# Patient Record
Sex: MALE | Race: WHITE | NOT HISPANIC OR LATINO
[De-identification: names, ages, dates, MRNs, and addresses within clinical notes are randomized per-mention and may not be internally consistent; named-entity substitution may affect disease eponyms.]

---

## 2019-01-27 ENCOUNTER — TRANSCRIPTION ENCOUNTER (OUTPATIENT)
Age: 69
End: 2019-01-27

## 2019-09-21 ENCOUNTER — TRANSCRIPTION ENCOUNTER (OUTPATIENT)
Age: 69
End: 2019-09-21

## 2020-01-14 ENCOUNTER — TRANSCRIPTION ENCOUNTER (OUTPATIENT)
Age: 70
End: 2020-01-14

## 2020-05-02 ENCOUNTER — TRANSCRIPTION ENCOUNTER (OUTPATIENT)
Age: 70
End: 2020-05-02

## 2022-03-13 ENCOUNTER — TRANSCRIPTION ENCOUNTER (OUTPATIENT)
Age: 72
End: 2022-03-13

## 2022-11-10 ENCOUNTER — NON-APPOINTMENT (OUTPATIENT)
Age: 72
End: 2022-11-10

## 2022-11-15 ENCOUNTER — NON-APPOINTMENT (OUTPATIENT)
Age: 72
End: 2022-11-15

## 2023-01-31 ENCOUNTER — NON-APPOINTMENT (OUTPATIENT)
Age: 73
End: 2023-01-31

## 2023-03-21 ENCOUNTER — NON-APPOINTMENT (OUTPATIENT)
Age: 73
End: 2023-03-21

## 2023-03-21 PROBLEM — Z00.00 ENCOUNTER FOR PREVENTIVE HEALTH EXAMINATION: Status: ACTIVE | Noted: 2023-03-21

## 2023-03-23 ENCOUNTER — APPOINTMENT (OUTPATIENT)
Dept: ORTHOPEDIC SURGERY | Facility: CLINIC | Age: 73
End: 2023-03-23
Payer: MEDICARE

## 2023-03-23 VITALS
DIASTOLIC BLOOD PRESSURE: 89 MMHG | WEIGHT: 169 LBS | HEIGHT: 69 IN | SYSTOLIC BLOOD PRESSURE: 150 MMHG | BODY MASS INDEX: 25.03 KG/M2

## 2023-03-23 DIAGNOSIS — M16.11 UNILATERAL PRIMARY OSTEOARTHRITIS, RIGHT HIP: ICD-10-CM

## 2023-03-23 DIAGNOSIS — M25.551 PAIN IN RIGHT HIP: ICD-10-CM

## 2023-03-23 PROCEDURE — 99205 OFFICE O/P NEW HI 60 MIN: CPT

## 2023-03-23 PROCEDURE — 73502 X-RAY EXAM HIP UNI 2-3 VIEWS: CPT

## 2023-03-23 NOTE — CONSULT LETTER
[Dear  ___] : Dear  [unfilled], [Consult Letter:] : I had the pleasure of evaluating your patient, [unfilled]. [Please see my note below.] : Please see my note below. [Consult Closing:] : Thank you very much for allowing me to participate in the care of this patient.  If you have any questions, please do not hesitate to contact me. [Sincerely,] : Sincerely, [FreeTextEntry2] : MAISHA GIBSON MD\par  [FreeTextEntry3] : Sj Miles MD\par Chief of Joint Replacement\par Primary [facility] Rev[facility] Hip and Knee Replacement \par [facility]\par \par

## 2023-03-23 NOTE — DISCUSSION/SUMMARY
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 Based upon the patients continued symptoms and failure to respond to conservative treatment I have recommended a right total hip replacement for the patient. A discussion was had with the patient regarding a right total hip replacement. Anterior and posterior exposures were discussed. For this patient an anterior approach is appropriate. A long discussion was had with the patient as what the total joint replacement would entail. A model was used to demonstrate the operation and to discuss bearing surfaces of the implants. The hospitalization and rehabilitation were discussed. The use of perioperative antibiotics and DVT prophylaxis were discussed. The risks, benefits and alternatives to surgical intervention were discussed at length with the patient. Specific risks discussed included: infection, wound breakdown, numbness and damage to nerves, tendon, muscle, arteries or other blood vessels, and the possibility of leg length discrepancy. The possibility of recurrent pain, no improvement in pain and actual worsening of the pain were also mentioned in conversation with the patient. Medical complications related to the patient's general medical health including deep vein thrombosis, pulmonary embolism, heart attack, stroke, death and other complications from anesthesia were discussed as well. The patient was told that we will take steps to minimize these risks by using sterile technique, antibiotics and DVT prophylaxis when appropriate and following the patient postoperatively in the clinic setting to monitor progress. The benefits of surgery were discussed with the patient including the potential to improve the current clinical condition through operative intervention. Alternatives to surgical intervention include continued conservative management which may yield less than optimal results in this particular patient. All questions were answered to the satisfaction of the patient. Models were used as an educational tool. We did discuss implant choices and fixation, with shared decision making with the patient.		 \par \par We are planning for robotic assistance for the total hip replacement. We discussed that this will require placement of iliac crest pins in the contralateral iliac crest for pelvic bone registration to allow for robotic guidance for the surgery. We will utilize robotic assistance to improve accuracy of the implants, and accurate restoration of both leg lengths and femoral offset.

## 2023-03-23 NOTE — ADDENDUM
[FreeTextEntry1] : This note was authored by Mehdi Medina working as a medical scribe for Dr. Sj Miles. The note was reviewed, edited, and revised by Dr. Sj Miles whom is in agreement with the exam findings, imaging findings, and treatment plan. 03/23/2023

## 2023-03-23 NOTE — PHYSICAL EXAM
[de-identified] : The patient appears well nourished  and in no apparent distress.  The patient is alert and oriented to person, place, and time.   Affect and mood appear normal. The head is normocephalic and atraumatic.  The eyes reveal normal sclera and extra ocular muscles are intact. The tongue is midline with no apparent lesions.  Skin shows normal turgor with no evidence of eczema or psoriasis.  No respiratory distress noted.  Sensation grossly intact.		  [de-identified] : Exam of the right hip shows hip flexion of 70 degrees (with pain), hip external rotation of 20 degrees, hip internal rotation of 0 degrees. His right leg is 1 cm shorter than his left leg. \par  5/5 motor strength bilaterally distally. Sensation intact distally.		  [de-identified] : X-ray: AP view of the pelvis and 2 views of the right hip demonstrate bone on bone arthritis.

## 2023-03-30 ENCOUNTER — OUTPATIENT (OUTPATIENT)
Dept: OUTPATIENT SERVICES | Facility: HOSPITAL | Age: 73
LOS: 1 days | End: 2023-03-30
Payer: MEDICARE

## 2023-03-30 ENCOUNTER — APPOINTMENT (OUTPATIENT)
Dept: CT IMAGING | Facility: CLINIC | Age: 73
End: 2023-03-30
Payer: MEDICARE

## 2023-03-30 DIAGNOSIS — M16.11 UNILATERAL PRIMARY OSTEOARTHRITIS, RIGHT HIP: ICD-10-CM

## 2023-03-30 PROCEDURE — 73700 CT LOWER EXTREMITY W/O DYE: CPT

## 2023-03-30 PROCEDURE — 73700 CT LOWER EXTREMITY W/O DYE: CPT | Mod: 26,RT,MH

## 2023-04-06 ENCOUNTER — OUTPATIENT (OUTPATIENT)
Dept: OUTPATIENT SERVICES | Facility: HOSPITAL | Age: 73
LOS: 1 days | End: 2023-04-06
Payer: MEDICARE

## 2023-04-06 VITALS
OXYGEN SATURATION: 98 % | WEIGHT: 172.84 LBS | DIASTOLIC BLOOD PRESSURE: 80 MMHG | HEART RATE: 87 BPM | RESPIRATION RATE: 16 BRPM | SYSTOLIC BLOOD PRESSURE: 122 MMHG | HEIGHT: 69 IN | TEMPERATURE: 97 F

## 2023-04-06 DIAGNOSIS — Z91.89 OTHER SPECIFIED PERSONAL RISK FACTORS, NOT ELSEWHERE CLASSIFIED: ICD-10-CM

## 2023-04-06 DIAGNOSIS — Z98.890 OTHER SPECIFIED POSTPROCEDURAL STATES: Chronic | ICD-10-CM

## 2023-04-06 DIAGNOSIS — I48.91 UNSPECIFIED ATRIAL FIBRILLATION: ICD-10-CM

## 2023-04-06 DIAGNOSIS — Z29.9 ENCOUNTER FOR PROPHYLACTIC MEASURES, UNSPECIFIED: ICD-10-CM

## 2023-04-06 DIAGNOSIS — Z01.818 ENCOUNTER FOR OTHER PREPROCEDURAL EXAMINATION: ICD-10-CM

## 2023-04-06 DIAGNOSIS — M16.11 UNILATERAL PRIMARY OSTEOARTHRITIS, RIGHT HIP: ICD-10-CM

## 2023-04-06 DIAGNOSIS — I10 ESSENTIAL (PRIMARY) HYPERTENSION: ICD-10-CM

## 2023-04-06 LAB
A1C WITH ESTIMATED AVERAGE GLUCOSE RESULT: 5.2 % — SIGNIFICANT CHANGE UP (ref 4–5.6)
ANION GAP SERPL CALC-SCNC: 11 MMOL/L — SIGNIFICANT CHANGE UP (ref 5–17)
APTT BLD: 36.3 SEC — HIGH (ref 27.5–35.5)
BASOPHILS # BLD AUTO: 0.04 K/UL — SIGNIFICANT CHANGE UP (ref 0–0.2)
BASOPHILS NFR BLD AUTO: 0.6 % — SIGNIFICANT CHANGE UP (ref 0–2)
BLD GP AB SCN SERPL QL: SIGNIFICANT CHANGE UP
BUN SERPL-MCNC: 17.5 MG/DL — SIGNIFICANT CHANGE UP (ref 8–20)
CALCIUM SERPL-MCNC: 9.3 MG/DL — SIGNIFICANT CHANGE UP (ref 8.4–10.5)
CHLORIDE SERPL-SCNC: 103 MMOL/L — SIGNIFICANT CHANGE UP (ref 96–108)
CO2 SERPL-SCNC: 29 MMOL/L — SIGNIFICANT CHANGE UP (ref 22–29)
CREAT SERPL-MCNC: 0.86 MG/DL — SIGNIFICANT CHANGE UP (ref 0.5–1.3)
EGFR: 90 ML/MIN/1.73M2 — SIGNIFICANT CHANGE UP
EOSINOPHIL # BLD AUTO: 0.16 K/UL — SIGNIFICANT CHANGE UP (ref 0–0.5)
EOSINOPHIL NFR BLD AUTO: 2.5 % — SIGNIFICANT CHANGE UP (ref 0–6)
ESTIMATED AVERAGE GLUCOSE: 103 MG/DL — SIGNIFICANT CHANGE UP (ref 68–114)
GLUCOSE SERPL-MCNC: 101 MG/DL — HIGH (ref 70–99)
HCT VFR BLD CALC: 41.6 % — SIGNIFICANT CHANGE UP (ref 39–50)
HGB BLD-MCNC: 13.9 G/DL — SIGNIFICANT CHANGE UP (ref 13–17)
IMM GRANULOCYTES NFR BLD AUTO: 0.2 % — SIGNIFICANT CHANGE UP (ref 0–0.9)
INR BLD: 1.32 RATIO — HIGH (ref 0.88–1.16)
LYMPHOCYTES # BLD AUTO: 1.17 K/UL — SIGNIFICANT CHANGE UP (ref 1–3.3)
LYMPHOCYTES # BLD AUTO: 18 % — SIGNIFICANT CHANGE UP (ref 13–44)
MCHC RBC-ENTMCNC: 33.1 PG — SIGNIFICANT CHANGE UP (ref 27–34)
MCHC RBC-ENTMCNC: 33.4 GM/DL — SIGNIFICANT CHANGE UP (ref 32–36)
MCV RBC AUTO: 99 FL — SIGNIFICANT CHANGE UP (ref 80–100)
MONOCYTES # BLD AUTO: 0.52 K/UL — SIGNIFICANT CHANGE UP (ref 0–0.9)
MONOCYTES NFR BLD AUTO: 8 % — SIGNIFICANT CHANGE UP (ref 2–14)
NEUTROPHILS # BLD AUTO: 4.6 K/UL — SIGNIFICANT CHANGE UP (ref 1.8–7.4)
NEUTROPHILS NFR BLD AUTO: 70.7 % — SIGNIFICANT CHANGE UP (ref 43–77)
PLATELET # BLD AUTO: 196 K/UL — SIGNIFICANT CHANGE UP (ref 150–400)
POTASSIUM SERPL-MCNC: 4.2 MMOL/L — SIGNIFICANT CHANGE UP (ref 3.5–5.3)
POTASSIUM SERPL-SCNC: 4.2 MMOL/L — SIGNIFICANT CHANGE UP (ref 3.5–5.3)
PROTHROM AB SERPL-ACNC: 15.4 SEC — HIGH (ref 10.5–13.4)
RBC # BLD: 4.2 M/UL — SIGNIFICANT CHANGE UP (ref 4.2–5.8)
RBC # FLD: 12.9 % — SIGNIFICANT CHANGE UP (ref 10.3–14.5)
SODIUM SERPL-SCNC: 143 MMOL/L — SIGNIFICANT CHANGE UP (ref 135–145)
WBC # BLD: 6.5 K/UL — SIGNIFICANT CHANGE UP (ref 3.8–10.5)
WBC # FLD AUTO: 6.5 K/UL — SIGNIFICANT CHANGE UP (ref 3.8–10.5)

## 2023-04-06 PROCEDURE — G0463: CPT

## 2023-04-06 PROCEDURE — 93005 ELECTROCARDIOGRAM TRACING: CPT

## 2023-04-06 PROCEDURE — 93010 ELECTROCARDIOGRAM REPORT: CPT

## 2023-04-06 NOTE — H&P PST ADULT - PROBLEM SELECTOR PLAN 1
Patient is scheduled for right hip replacement with JANNET robot with Dr Miles on 4/26/23.  Medical and cardiac evaluation pending

## 2023-04-06 NOTE — H&P PST ADULT - NSICDXPASTSURGICALHX_GEN_ALL_CORE_FT
PAST SURGICAL HISTORY:  H/O colonoscopy     H/O inguinal hernia repair     History of meniscectomy of left knee     S/P rotator cuff repair

## 2023-04-06 NOTE — H&P PST ADULT - NEGATIVE SKIN SYMPTOMS
no rash/no itching/no change in size/color of mole/no tumor/no brittle nails/no pitted nails/no hair loss

## 2023-04-06 NOTE — H&P PST ADULT - HISTORY OF PRESENT ILLNESS
73 year old male with pmhx of AFIB on Eliquis, HTN, OA.  Patient presents to PST today with complaint of right hip pain for the last few years,   Patient describes pain as aching, pain is intermittent, 7/10 in severity,   difficult to get in and out of the car, worse when he lays on his right side, standing from sitting  some relief with Tylenol has not tried injections   Imaging revealed       Patient is scheduled for right hip replacement with JANNET robot with Dr Miles on 4/26/23.  Medical and cardiac evaluation pending  73 year old male with pmhx of AFIB on Eliquis, HTN, OA.  Patient presents to [facility] today with complaint of right hip pain for the last few years. Patient describes pain as aching, pain is intermittent, 7/10 in severity, difficult to get in and out of the car, worse when he lays on his right side, standing from sitting, some relief with Tylenol has not tried injections. CT of right hip 4/3/23 revealed makoplasty severe right hip osteoarthritis, and mild/moderate degenerative osteoarthritis of the left hip and bilateral knees as described. Grade 1 anterolisthesis at L4-5, with moderate disc degeneration at L4-5 and L5-S1. Patient is scheduled for right hip replacement with JANNET robot with Dr Miles on 4/26/23.  Medical and cardiac evaluation pending

## 2023-04-06 NOTE — H&P PST ADULT - CARDIOVASCULAR
negative normal/S1 S2 present/no gallops/no rub/no murmur/no JVD/normal PMI/no pedal edema/Irregularly irregular rhythm

## 2023-04-06 NOTE — H&P PST ADULT - ASSESSMENT
CAPRINI SCORE    AGE RELATED RISK FACTORS                                                             [ ] Age 41-60 years                                            (1 Point)  [ ] Age: 61-74 years                                           (2 Points)                 [ ] Age= 75 years                                                (3 Points)             DISEASE RELATED RISK FACTORS                                                       [ ] Edema in the lower extremities                 (1 Point)                     [ ] Varicose veins                                               (1 Point)                                 [ ] BMI > 25 Kg/m2                                            (1 Point)                                  [ ] Serious infection (ie PNA)                            (1 Point)                     [ ] Lung disease ( COPD, Emphysema)            (1 Point)                                                                          [ ] Acute myocardial infarction                         (1 Point)                  [ ] Congestive heart failure (in the previous month)  (1 Point)         [ ] Inflammatory bowel disease                            (1 Point)                  [ ] Central venous access, PICC or Port               (2 points)       (within the last month)                                                                [ ] Stroke (in the previous month)                        (5 Points)    [ ] Previous or present malignancy                       (2 points)                                                                                                                                                         HEMATOLOGY RELATED FACTORS                                                         [ ] Prior episodes of VTE                                     (3 Points)                     [ ] Positive family history for VTE                      (3 Points)                  [ ] Prothrombin 40953 A                                     (3 Points)                     [ ] Factor V Leiden                                                (3 Points)                        [ ] Lupus anticoagulants                                      (3 Points)                                                           [ ] Anticardiolipin antibodies                              (3 Points)                                                       [ ] High homocysteine in the blood                   (3 Points)                                             [ ] Other congenital or acquired thrombophilia      (3 Points)                                                [ ] Heparin induced thrombocytopenia                  (3 Points)                                        MOBILITY RELATED FACTORS  [ ] Bed rest                                                         (1 Point)  [ ] Plaster cast                                                    (2 points)  [ ] Bed bound for more than 72 hours           (2 Points)    GENDER SPECIFIC FACTORS  [ ] Pregnancy or had a baby within the last month   (1 Point)  [ ] Post-partum < 6 weeks                                   (1 Point)  [ ] Hormonal therapy  or oral contraception   (1 Point)  [ ] History of pregnancy complications              (1 point)  [ ] Unexplained or recurrent              (1 Point)    OTHER RISK FACTORS                                           (1 Point)  [ ] BMI >40, smoking, diabetes requiring insulin, chemotherapy  blood transfusions and length of surgery over 2 hours    SURGERY RELATED RISK FACTORS  [ ]  Section within the last month     (1 Point)  [ ] Minor surgery                                                  (1 Point)  [ ] Arthroscopic surgery                                       (2 Points)  [ ] Planned major surgery lasting more            (2 Points)      than 45 minutes     [ ] Elective hip or knee joint replacement       (5 points)       surgery                                                TRAUMA RELATED RISK FACTORS  [ ] Fracture of the hip, pelvis, or leg                       (5 Points)  [ ] Spinal cord injury resulting in paralysis             (5 points)       (in the previous month)    [ ] Paralysis  (less than 1 month)                             (5 Points)  [ ] Multiple Trauma within 1 month                        (5 Points)    Total Score [        ]    Caprini Score 0-2: Low Risk, NO VTE prophylaxis required for most patients, encourage ambulation  Caprini Score 3-6: Moderate Risk , pharmacologic VTE prophylaxis is indicated for most patients (in the absence of contraindications)  Caprini Score Greater than or =7: High risk, pharmocologic VTE prophylaxis indicated for most patients (in the absence of contraindications)                OPIOID RISK TOOL    STEFF EACH BOX THAT APPLIES AND ADD TOTALS AT THE END    FAMILY HISTORY OF SUBSTANCE ABUSE                 FEMALE         MALE                                                Alcohol                             [  ]1 pt          [  ]3pts                                               Illegal Durgs                     [  ]2 pts        [  ]3pts                                               Rx Drugs                           [  ]4 pts        [  ]4 pts    PERSONAL HISTORY OF SUBSTANCE ABUSE                                                                                          Alcohol                             [  ]3 pts       [  ]3 pts                                               Illegal Durgs                     [  ]4 pts        [  ]4 pts                                               Rx Drugs                           [  ]5 pts        [  ]5 pts    AGE BETWEEN 16-45 YEARS                                      [  ]1 pt         [  ]1 pt    HISTORY OF PREADOLESCENT   SEXUAL ABUSE                                                             [  ]3 pts        [  ]0pts    PSYCHOLOGICAL DISEASE                     ADD, OCD, Bipolar, Schizophrenia        [  ]2 pts         [  ]2 pts                      Depression                                               [  ]1 pt           [  ]1 pt           SCORING TOTAL   (add numbers and type here)              (***)                                     A score of 3 or lower indicated LOW risk for future opiod abuse  A score of 4 to 7 indicated moderate risk for future opiod abuse  A score of 8 or higher indicates a high risk for opiod abuse    Patient educated on surgical scrub, COVID testing, preadmission instructions, medical clearance and day of procedure medications, verbalizes understanding.   Pt instructed to stop vitamins/supplements/herbal medications/ASA/NSAIDS for one week prior to surgery and discuss with PMD.                CAPRINI SCORE    AGE RELATED RISK FACTORS                                                             [ ] Age 41-60 years                                            (1 Point)  [ ] Age: 61-74 years                                           (2 Points)                 [ x] Age= 75 years                                                (3 Points)             DISEASE RELATED RISK FACTORS                                                       [ ] Edema in the lower extremities                 (1 Point)                     [ ] Varicose veins                                               (1 Point)                                 [x ] BMI > 25 Kg/m2                                            (1 Point)                                  [ ] Serious infection (ie PNA)                            (1 Point)                     [ ] Lung disease ( COPD, Emphysema)            (1 Point)                                                                          [ ] Acute myocardial infarction                         (1 Point)                  [ ] Congestive heart failure (in the previous month)  (1 Point)         [ ] Inflammatory bowel disease                            (1 Point)                  [ ] Central venous access, PICC or Port               (2 points)       (within the last month)                                                                [ ] Stroke (in the previous month)                        (5 Points)    [ ] Previous or present malignancy                       (2 points)                                                                                                                                                         HEMATOLOGY RELATED FACTORS                                                         [ ] Prior episodes of VTE                                     (3 Points)                     [ ] Positive family history for VTE                      (3 Points)                  [ ] Prothrombin 16606 A                                     (3 Points)                     [ ] Factor V Leiden                                                (3 Points)                        [ ] Lupus anticoagulants                                      (3 Points)                                                           [ ] Anticardiolipin antibodies                              (3 Points)                                                       [ ] High homocysteine in the blood                   (3 Points)                                             [ ] Other congenital or acquired thrombophilia      (3 Points)                                                [ ] Heparin induced thrombocytopenia                  (3 Points)                                        MOBILITY RELATED FACTORS  [ ] Bed rest                                                         (1 Point)  [ ] Plaster cast                                                    (2 points)  [ ] Bed bound for more than 72 hours           (2 Points)    GENDER SPECIFIC FACTORS  [ ] Pregnancy or had a baby within the last month   (1 Point)  [ ] Post-partum < 6 weeks                                   (1 Point)  [ ] Hormonal therapy  or oral contraception   (1 Point)  [ ] History of pregnancy complications              (1 point)  [ ] Unexplained or recurrent              (1 Point)    OTHER RISK FACTORS                                           (1 Point)  [ ] BMI >40, smoking, diabetes requiring insulin, chemotherapy  blood transfusions and length of surgery over 2 hours    SURGERY RELATED RISK FACTORS  [ ]  Section within the last month     (1 Point)  [ ] Minor surgery                                                  (1 Point)  [ ] Arthroscopic surgery                                       (2 Points)  [ ] Planned major surgery lasting more            (2 Points)      than 45 minutes     [x ] Elective hip or knee joint replacement       (5 points)       surgery                                                TRAUMA RELATED RISK FACTORS  [ ] Fracture of the hip, pelvis, or leg                       (5 Points)  [ ] Spinal cord injury resulting in paralysis             (5 points)       (in the previous month)    [ ] Paralysis  (less than 1 month)                             (5 Points)  [ ] Multiple Trauma within 1 month                        (5 Points)    Total Score [        ]    Caprini Score 0-2: Low Risk, NO VTE prophylaxis required for most patients, encourage ambulation  Caprini Score 3-6: Moderate Risk , pharmacologic VTE prophylaxis is indicated for most patients (in the absence of contraindications)  Caprini Score Greater than or =7: High risk, pharmocologic VTE prophylaxis indicated for most patients (in the absence of contraindications)                OPIOID RISK TOOL    STEFF EACH BOX THAT APPLIES AND ADD TOTALS AT THE END    FAMILY HISTORY OF SUBSTANCE ABUSE                 FEMALE         MALE                                                Alcohol                             [  ]1 pt          [  ]3pts                                               Illegal Durgs                     [  ]2 pts        [  ]3pts                                               Rx Drugs                           [  ]4 pts        [  ]4 pts    PERSONAL HISTORY OF SUBSTANCE ABUSE                                                                                          Alcohol                             [  ]3 pts       [  ]3 pts                                               Illegal Durgs                     [  ]4 pts        [  ]4 pts                                               Rx Drugs                           [  ]5 pts        [  ]5 pts    AGE BETWEEN 16-45 YEARS                                      [  ]1 pt         [  ]1 pt    HISTORY OF PREADOLESCENT   SEXUAL ABUSE                                                             [  ]3 pts        [  ]0pts    PSYCHOLOGICAL DISEASE                     ADD, OCD, Bipolar, Schizophrenia        [  ]2 pts         [  ]2 pts                      Depression                                               [  ]1 pt           [  ]1 pt           SCORING TOTAL   (add numbers and type here)              (***)                                     A score of 3 or lower indicated LOW risk for future opiod abuse  A score of 4 to 7 indicated moderate risk for future opiod abuse  A score of 8 or higher indicates a high risk for opiod abuse    Patient educated on surgical scrub, COVID testing, preadmission instructions, medical clearance and day of procedure medications, verbalizes understanding.   Pt instructed to stop vitamins/supplements/herbal medications/ASA/NSAIDS for one week prior to surgery and discuss with PMD.                CAPRINI SCORE    AGE RELATED RISK FACTORS                                                             [ ] Age 41-60 years                                            (1 Point)  [ ] Age: 61-74 years                                           (2 Points)                 [ x] Age= 75 years                                                (3 Points)             DISEASE RELATED RISK FACTORS                                                       [ ] Edema in the lower extremities                 (1 Point)                     [ ] Varicose veins                                               (1 Point)                                 [x ] BMI > 25 Kg/m2                                            (1 Point)                                  [ ] Serious infection (ie PNA)                            (1 Point)                     [ ] Lung disease ( COPD, Emphysema)            (1 Point)                                                                          [ ] Acute myocardial infarction                         (1 Point)                  [ ] Congestive heart failure (in the previous month)  (1 Point)         [ ] Inflammatory bowel disease                            (1 Point)                  [ ] Central venous access, PICC or Port               (2 points)       (within the last month)                                                                [ ] Stroke (in the previous month)                        (5 Points)    [ ] Previous or present malignancy                       (2 points)                                                                                                                                                         HEMATOLOGY RELATED FACTORS                                                         [ ] Prior episodes of VTE                                     (3 Points)                     [ ] Positive family history for VTE                      (3 Points)                  [ ] Prothrombin 85807 A                                     (3 Points)                     [ ] Factor V Leiden                                                (3 Points)                        [ ] Lupus anticoagulants                                      (3 Points)                                                           [ ] Anticardiolipin antibodies                              (3 Points)                                                       [ ] High homocysteine in the blood                   (3 Points)                                             [ ] Other congenital or acquired thrombophilia      (3 Points)                                                [ ] Heparin induced thrombocytopenia                  (3 Points)                                        MOBILITY RELATED FACTORS  [ ] Bed rest                                                         (1 Point)  [ ] Plaster cast                                                    (2 points)  [ ] Bed bound for more than 72 hours           (2 Points)    GENDER SPECIFIC FACTORS  [ ] Pregnancy or had a baby within the last month   (1 Point)  [ ] Post-partum < 6 weeks                                   (1 Point)  [ ] Hormonal therapy  or oral contraception   (1 Point)  [ ] History of pregnancy complications              (1 point)  [ ] Unexplained or recurrent              (1 Point)    OTHER RISK FACTORS                                           (1 Point)  [ ] BMI >40, smoking, diabetes requiring insulin, chemotherapy  blood transfusions and length of surgery over 2 hours    SURGERY RELATED RISK FACTORS  [ ]  Section within the last month     (1 Point)  [ ] Minor surgery                                                  (1 Point)  [ ] Arthroscopic surgery                                       (2 Points)  [ ] Planned major surgery lasting more            (2 Points)      than 45 minutes     [x ] Elective hip or knee joint replacement       (5 points)       surgery                                                TRAUMA RELATED RISK FACTORS  [ ] Fracture of the hip, pelvis, or leg                       (5 Points)  [ ] Spinal cord injury resulting in paralysis             (5 points)       (in the previous month)    [ ] Paralysis  (less than 1 month)                             (5 Points)  [ ] Multiple Trauma within 1 month                        (5 Points)    Total Score [   9     ]    Caprini Score 0-2: Low Risk, NO VTE prophylaxis required for most patients, encourage ambulation  Caprini Score 3-6: Moderate Risk , pharmacologic VTE prophylaxis is indicated for most patients (in the absence of contraindications)  Caprini Score Greater than or =7: High risk, pharmocologic VTE prophylaxis indicated for most patients (in the absence of contraindications)        OPIOID RISK TOOL    STEFF EACH BOX THAT APPLIES AND ADD TOTALS AT THE END    FAMILY HISTORY OF SUBSTANCE ABUSE                 FEMALE         MALE                                                Alcohol                             [  ]1 pt          [  ]3pts                                               Illegal Durgs                     [  ]2 pts        [  ]3pts                                               Rx Drugs                           [  ]4 pts        [  ]4 pts    PERSONAL HISTORY OF SUBSTANCE ABUSE                                                                                          Alcohol                             [  ]3 pts       [  ]3 pts                                               Illegal Durgs                     [  ]4 pts        [  ]4 pts                                               Rx Drugs                           [  ]5 pts        [  ]5 pts    AGE BETWEEN 16-45 YEARS                                      [  ]1 pt         [  ]1 pt    HISTORY OF PREADOLESCENT   SEXUAL ABUSE                                                             [  ]3 pts        [  ]0pts    PSYCHOLOGICAL DISEASE                     ADD, OCD, Bipolar, Schizophrenia        [  ]2 pts         [  ]2 pts                      Depression                                               [  ]1 pt           [  ]1 pt           SCORING TOTAL   0                                   A score of 3 or lower indicated LOW risk for future opiod abuse  A score of 4 to 7 indicated moderate risk for future opiod abuse  A score of 8 or higher indicates a high risk for opiod abuse    73 year old male with pmhx of AFIB on Eliquis, HTN, OA.  Patient presents to PST today with complaint of right hip pain for the last few years. Patient describes pain as aching, pain is intermittent, 7/10 in severity, difficult to get in and out of the car, worse when he lays on his right side, standing from sitting, some relief with Tylenol has not tried injections. CT of right hip 4/3/23 revealed makoplasty severe right hip osteoarthritis, and mild/moderate degenerative osteoarthritis of the left hip and bilateral knees as described. Grade 1 anterolisthesis at L4-5, with moderate disc degeneration at L4-5 and L5-S1. Patient is scheduled for right hip replacement with JANNET robot with Dr Miles on 23. Patient educated on surgical scrub, COVID testing, preadmission instructions, medical, cardiac clearance and day of procedure medications, verbalizes understanding. Pt instructed to stop vitamins/supplements/herbal medications/NSAIDS for one week prior to surgery and discuss with PMD.

## 2023-04-06 NOTE — H&P PST ADULT - PROBLEM SELECTOR PLAN 2
Irregular rhythm today at PST, continue eliquis as prescribed, discuss when to stop with cardiologist, patient verbalized understanding, Cardiac evaluation pending

## 2023-04-06 NOTE — H&P PST ADULT - NSICDXPASTMEDICALHX_GEN_ALL_CORE_FT
PAST MEDICAL HISTORY:  Unilateral osteoarthritis of hip, right      PAST MEDICAL HISTORY:  Afib     Hypertension     Unilateral osteoarthritis of hip, right

## 2023-04-07 LAB
MRSA PCR RESULT.: SIGNIFICANT CHANGE UP
S AUREUS DNA NOSE QL NAA+PROBE: SIGNIFICANT CHANGE UP

## 2023-04-25 ENCOUNTER — TRANSCRIPTION ENCOUNTER (OUTPATIENT)
Age: 73
End: 2023-04-25

## 2023-04-26 ENCOUNTER — OUTPATIENT (OUTPATIENT)
Dept: OUTPATIENT SERVICES | Facility: HOSPITAL | Age: 73
LOS: 1 days | End: 2023-04-26
Payer: MEDICARE

## 2023-04-26 ENCOUNTER — APPOINTMENT (OUTPATIENT)
Dept: ORTHOPEDIC SURGERY | Facility: HOSPITAL | Age: 73
End: 2023-04-26

## 2023-04-26 ENCOUNTER — TRANSCRIPTION ENCOUNTER (OUTPATIENT)
Age: 73
End: 2023-04-26

## 2023-04-26 DIAGNOSIS — Z98.890 OTHER SPECIFIED POSTPROCEDURAL STATES: Chronic | ICD-10-CM

## 2023-04-26 PROCEDURE — 27130 TOTAL HIP ARTHROPLASTY: CPT | Mod: AS,RT

## 2023-04-26 DEVICE — STEM FEM ACTIS HIGH COLLAR SZ 7: Type: IMPLANTABLE DEVICE | Site: RIGHT | Status: FUNCTIONAL

## 2023-04-26 DEVICE — FEM HD CERAMIC ART BIOLOX DELTA: Type: IMPLANTABLE DEVICE | Site: RIGHT | Status: FUNCTIONAL

## 2023-04-26 DEVICE — BONE WAX 2.5GM: Type: IMPLANTABLE DEVICE | Site: RIGHT | Status: FUNCTIONAL

## 2023-04-26 DEVICE — MAKO KNEE TIBIAL CHECKPOINT: Type: IMPLANTABLE DEVICE | Site: RIGHT | Status: FUNCTIONAL

## 2023-04-26 DEVICE — MAKO CHECKPOINT 3.5MM HEX IMPACTION: Type: IMPLANTABLE DEVICE | Site: RIGHT | Status: FUNCTIONAL

## 2023-04-26 DEVICE — MAKO BONE PIN 4MM X 170MM: Type: IMPLANTABLE DEVICE | Site: RIGHT | Status: FUNCTIONAL

## 2023-04-26 DEVICE — SHELL ACET TRIDENT II E 52MM: Type: IMPLANTABLE DEVICE | Site: RIGHT | Status: FUNCTIONAL

## 2023-04-26 DEVICE — LINER ACET TRIDENT X3 0 DEG 36MM E: Type: IMPLANTABLE DEVICE | Site: RIGHT | Status: FUNCTIONAL

## 2023-04-26 DEVICE — FLOSEAL FAST PREP 10ML: Type: IMPLANTABLE DEVICE | Site: RIGHT | Status: FUNCTIONAL

## 2023-04-26 RX ORDER — VALSARTAN 80 MG/1
0 TABLET ORAL
Refills: 0 | DISCHARGE

## 2023-04-26 RX ORDER — GLUCOSAMINE HCL/CHONDROITIN SU 500-400 MG
0 CAPSULE ORAL
Refills: 0 | DISCHARGE

## 2023-04-27 ENCOUNTER — TRANSCRIPTION ENCOUNTER (OUTPATIENT)
Age: 73
End: 2023-04-27

## 2023-04-27 PROCEDURE — 80048 BASIC METABOLIC PNL TOTAL CA: CPT

## 2023-04-27 PROCEDURE — C1713: CPT

## 2023-04-27 PROCEDURE — C1889: CPT

## 2023-04-27 PROCEDURE — 27130 TOTAL HIP ARTHROPLASTY: CPT | Mod: RT

## 2023-04-27 PROCEDURE — 73502 X-RAY EXAM HIP UNI 2-3 VIEWS: CPT

## 2023-04-27 PROCEDURE — C1776: CPT

## 2023-04-27 PROCEDURE — 97163 PT EVAL HIGH COMPLEX 45 MIN: CPT

## 2023-04-27 PROCEDURE — S2900: CPT

## 2023-04-27 PROCEDURE — 0055T BONE SRGRY CMPTR CT/MRI IMAG: CPT | Mod: RT

## 2023-04-27 PROCEDURE — 85027 COMPLETE CBC AUTOMATED: CPT

## 2023-04-27 PROCEDURE — 36415 COLL VENOUS BLD VENIPUNCTURE: CPT

## 2023-04-27 RX ORDER — APIXABAN 2.5 MG/1
1 TABLET, FILM COATED ORAL
Qty: 0 | Refills: 0 | DISCHARGE

## 2023-04-28 ENCOUNTER — TRANSCRIPTION ENCOUNTER (OUTPATIENT)
Age: 73
End: 2023-04-28

## 2023-04-30 ENCOUNTER — TRANSCRIPTION ENCOUNTER (OUTPATIENT)
Age: 73
End: 2023-04-30

## 2023-04-30 ENCOUNTER — APPOINTMENT (OUTPATIENT)
Dept: AFTER HOURS CARE | Facility: EMERGENCY ROOM | Age: 73
End: 2023-04-30
Payer: MEDICARE

## 2023-04-30 PROCEDURE — 99203 OFFICE O/P NEW LOW 30 MIN: CPT | Mod: 95

## 2023-04-30 NOTE — PHYSICAL EXAM
[No Acute Distress] : no acute distress [Well-Appearing] : well-appearing [Normal Sclera/Conjunctiva] : normal sclera/conjunctiva [Normal Voice/Communication] : normal voice/communication [EOMI] : extraocular movements intact [Normal Outer Ear/Nose] : the outer ears and nose were normal in appearance [No Respiratory Distress] : no respiratory distress  [No Accessory Muscle Use] : no accessory muscle use [No Skin Lesions] : no skin lesions [No Focal Deficits] : no focal deficits [Speech Grossly Normal] : speech grossly normal [Alert and Oriented x3] : oriented to person, place, and time [de-identified] : +macular rash to lower face, forehead, neck

## 2023-04-30 NOTE — REVIEW OF SYSTEMS
[Fever] : no fever [Chills] : no chills [Vision Problems] : no vision problems [Itching] : no itching [Chest Pain] : no chest pain [Palpitations] : no palpitations [Shortness Of Breath] : no shortness of breath [Wheezing] : no wheezing [Cough] : no cough [Nausea] : no nausea [Vomiting] : no vomiting [Joint Pain] : no joint pain [Skin Rash] : skin rash [Dizziness] : no dizziness [Headache] : no headache

## 2023-04-30 NOTE — ASSESSMENT
[FreeTextEntry1] : 73y M w/ PMHx Afib on Eliquis, recent hip replacement surgery 4 days ago presenting with rash to lower part of face that started yesterday. Macular rash to lower aspect of face and part of for head extending underneath chin and to superior aspect of his neck. No involvement of palms or soles of feet. Given only new medication Celebrex, patient advised to hold the medication concern for possible drug related rash, contact at orthopedic surgeon, apply emollient to affected area, avoid shaving our sun exposure. Given location of rash on face do not advise prescription for steroid cream, and given patient's medical history as he is on Eliquis will avoid oral steroids. Patient is agreeable with plan, states he will call his orthopedic surgeon and let him know, has been advised of signs and symptoms of a severe allergic reaction and understands when to go to the emergency department.

## 2023-04-30 NOTE — PLAN
[FreeTextEntry1] : -apply emollient to affected areas\par -avoid sun exposure and shaving\par -stop celebrex\par -f/u w/ orthopedic surgeon\par -anticipatory guidance discussed with patient including signs and symptoms of a severe allergic reaction

## 2023-04-30 NOTE — HISTORY OF PRESENT ILLNESS
[Home] : at home, [unfilled] , at the time of the visit. [Other Location: e.g. Home (Enter Location, City,State)___] : at [unfilled] [Verbal consent obtained from patient] : the patient, [unfilled] [FreeTextEntry8] : 73y M w/ PMHx Afib on Eliquis, recent hip replacement surgery 4 days ago presenting with rash to lower part of face that started yesterday. Patient states he was recently started on celebrex, tramadol for pain control but stopped the tramadol after one dose on Wednesday as he did not like the way it make him feel. Has continued with the Celebrex which he said he has never taken in the past. Patient noted this morning when attempting to shave his skin became more irritated. Denies fever, chills, tongue or throat swelling, difficulty breathing, nausea or vomiting. Patient denies any prior history of drug allergies. Also denies recent changes to detergents, face washes, shampoos or conditioners, shaving gel. Denies rash to palms or soles of feet. Only located to face. Denies associated swelling, dizziness, visual changes.

## 2023-05-01 ENCOUNTER — TRANSCRIPTION ENCOUNTER (OUTPATIENT)
Age: 73
End: 2023-05-01

## 2023-05-01 ENCOUNTER — NON-APPOINTMENT (OUTPATIENT)
Age: 73
End: 2023-05-01

## 2023-05-01 DIAGNOSIS — M16.11 UNILATERAL PRIMARY OSTEOARTHRITIS, RIGHT HIP: ICD-10-CM

## 2023-05-01 PROBLEM — I10 ESSENTIAL (PRIMARY) HYPERTENSION: Chronic | Status: ACTIVE | Noted: 2023-04-06

## 2023-05-01 PROBLEM — I48.91 UNSPECIFIED ATRIAL FIBRILLATION: Chronic | Status: ACTIVE | Noted: 2023-04-06

## 2023-05-08 ENCOUNTER — TRANSCRIPTION ENCOUNTER (OUTPATIENT)
Age: 73
End: 2023-05-08

## 2023-05-19 ENCOUNTER — APPOINTMENT (OUTPATIENT)
Dept: ORTHOPEDIC SURGERY | Facility: CLINIC | Age: 73
End: 2023-05-19
Payer: MEDICARE

## 2023-05-19 VITALS
SYSTOLIC BLOOD PRESSURE: 159 MMHG | HEIGHT: 69 IN | BODY MASS INDEX: 25.03 KG/M2 | WEIGHT: 169 LBS | HEART RATE: 98 BPM | DIASTOLIC BLOOD PRESSURE: 78 MMHG

## 2023-05-19 PROCEDURE — 99024 POSTOP FOLLOW-UP VISIT: CPT

## 2023-05-19 PROCEDURE — 73502 X-RAY EXAM HIP UNI 2-3 VIEWS: CPT

## 2023-05-19 NOTE — ADDENDUM
[FreeTextEntry1] : This note was authored by Mehdi Medina working as a medical scribe for Dr. Sj Miles. The note was reviewed, edited, and revised by Dr. Sj Miles whom is in agreement with the exam findings, imaging findings, and treatment plan. 05/19/2023

## 2023-05-19 NOTE — HISTORY OF PRESENT ILLNESS
[de-identified] : S/P: Right robotic-assisted anterior total hip replacement with JANNET. DOS: 4/26/23 [de-identified] : He  is doing well s/p right total hip replacement. He  completed in home physical therapy and is due to begin outpatient PT soon. He  is taking Oxycodone, Celebrex and Tylenol for pain and pain level is controlled. He  is taking aspirin for DVT ppx. Patient denies any fevers chills or constitutional symptoms. Patient denies any falls or Trauma. Overall patient is doing well. [de-identified] : Exam of the right hip and contralateral iliac crest shows well healing incisions, hip flexion of 100 degrees, hip external rotation of 50 degrees, hip internal rotation of 15 degrees. 5/5 motor strength bilaterally distally. Sensation intact distally.		  [de-identified] :  X-ray: AP of the pelvis and 2 views of the right hip demonstrate a right total hip arthroplasty in stable position, with no evidence of fracture, loosening, or dislocation.		  [de-identified] : The patient is doing very well 3 weeks after right anterior total hip replacement. The patient will be transitioned to outpatient physical therapy and a prescription was given for that. The patient will take aspirin 325 mg twice per day for DVT prophylaxis for the next three weeks. Anterior hip precautions were reinforced. Overall the patient is very happy with their outcome so far. Followup in 3 weeks with repeat x-rays.

## 2023-06-08 ENCOUNTER — APPOINTMENT (OUTPATIENT)
Dept: ORTHOPEDIC SURGERY | Facility: CLINIC | Age: 73
End: 2023-06-08
Payer: MEDICARE

## 2023-06-08 VITALS — WEIGHT: 169 LBS | HEIGHT: 69 IN | BODY MASS INDEX: 25.03 KG/M2

## 2023-06-08 PROCEDURE — 99024 POSTOP FOLLOW-UP VISIT: CPT

## 2023-06-08 PROCEDURE — 73502 X-RAY EXAM HIP UNI 2-3 VIEWS: CPT

## 2023-06-08 NOTE — HISTORY OF PRESENT ILLNESS
[de-identified] : S/P: Right robotic-assisted anterior total hip replacement with JANNET. DOS: 4/26/23. [de-identified] : HUE MAYO is a 73 year male 6 weeks s/p right THR.  He  is ambulating and transferring well without assistive devices. He  reports recently starting outpatient physical therapy. He continues aspirin for DVT prophylaxis. He  has returned to daily activities of life without significant pain or discomfort. Overall, he is very happy with the results of the surgery.		  [de-identified] : Exam of the right hip and contralateral iliac crest shows well healed incisions, hip flexion of 100 degrees, hip external rotation of 45 degrees, hip internal rotation of 15-20 degrees. \par 5/5 motor strength bilaterally distally. Sensation intact distally.		  [de-identified] :  X-ray: AP of the pelvis and 2 views of the right hip demonstrate a right total hip arthroplasty in stable position, with no evidence of fracture, loosening, or dislocation.		  [de-identified] : The patient is doing very well 6 weeks following anterior total hip replacement. Anterior hip precautions were reviewed and recommended to be followed for another 6 weeks. The patient will continue outpatient physical therapy. the patient may stop taking full strength aspirin at this point and resume preoperative aspirin dosing if applicable. Dental prophylaxis was reviewed. The patient is doing very well so far and overall very happy with their progress. Follow up in 6 weeks.

## 2023-06-08 NOTE — ADDENDUM
[FreeTextEntry1] : This note was authored by Mehdi Medina working as a medical scribe for Dr. Sj Miles. The note was reviewed, edited, and revised by Dr. Sj Miles whom is in agreement with the exam findings, imaging findings, and treatment plan. 06/08/2023

## 2023-06-22 ENCOUNTER — TRANSCRIPTION ENCOUNTER (OUTPATIENT)
Age: 73
End: 2023-06-22

## 2023-07-20 ENCOUNTER — TRANSCRIPTION ENCOUNTER (OUTPATIENT)
Age: 73
End: 2023-07-20

## 2023-07-21 ENCOUNTER — APPOINTMENT (OUTPATIENT)
Dept: ORTHOPEDIC SURGERY | Facility: CLINIC | Age: 73
End: 2023-07-21
Payer: MEDICARE

## 2023-07-21 VITALS — BODY MASS INDEX: 25.03 KG/M2 | HEIGHT: 69 IN | WEIGHT: 169 LBS

## 2023-07-21 DIAGNOSIS — Z96.641 PRESENCE OF RIGHT ARTIFICIAL HIP JOINT: ICD-10-CM

## 2023-07-21 DIAGNOSIS — Z47.1 AFTERCARE FOLLOWING JOINT REPLACEMENT SURGERY: ICD-10-CM

## 2023-07-21 DIAGNOSIS — Z96.649 PRESENCE OF UNSPECIFIED ARTIFICIAL HIP JOINT: ICD-10-CM

## 2023-07-21 PROCEDURE — 73502 X-RAY EXAM HIP UNI 2-3 VIEWS: CPT | Mod: 26,RT

## 2023-07-21 PROCEDURE — 99024 POSTOP FOLLOW-UP VISIT: CPT

## 2023-07-21 NOTE — HISTORY OF PRESENT ILLNESS
[de-identified] : S/P: Right robotic-assisted anterior total hip replacement with JANNET. DOS: 4/26/23. [de-identified] : HUE MAYO is a 73 year male 12 weeks s/p right THR.  He  is ambulating and transferring well without assistive devices. He  reports recently starting outpatient physical therapy. He  has returned to daily activities of life without significant pain or discomfort. Overall, he is very happy with the results of the surgery.		  [de-identified] : Exam of the right hip and contralateral iliac crest shows well healed incisions, hip flexion of 100 degrees, hip external rotation of 45 degrees, hip internal rotation of 15-20 degrees. \par 5/5 motor strength bilaterally distally. Sensation intact distally.		  [de-identified] :  X-ray: AP of the pelvis and 2 views of the right hip demonstrate a right total hip arthroplasty in stable position, with no evidence of fracture, loosening, or dislocation.		  [de-identified] : The patient is doing very well 12 weeks following anterior total hip replacement. The patient will continue outpatient physical therapy. the patient may stop taking full strength aspirin at this point and resume preoperative aspirin dosing if applicable. Dental prophylaxis was reviewed. The patient is doing very well so far and overall very happy with their progress. Follow up annually.

## 2023-07-28 ENCOUNTER — NON-APPOINTMENT (OUTPATIENT)
Age: 73
End: 2023-07-28

## 2023-11-16 ENCOUNTER — NON-APPOINTMENT (OUTPATIENT)
Age: 73
End: 2023-11-16

## 2023-12-10 ENCOUNTER — NON-APPOINTMENT (OUTPATIENT)
Age: 73
End: 2023-12-10

## 2023-12-12 ENCOUNTER — NON-APPOINTMENT (OUTPATIENT)
Age: 73
End: 2023-12-12

## 2024-03-06 ASSESSMENT — HOOS JR: HOOS JR RAW SCORE: 0

## 2024-04-19 ENCOUNTER — NON-APPOINTMENT (OUTPATIENT)
Age: 74
End: 2024-04-19

## 2025-02-26 ENCOUNTER — NON-APPOINTMENT (OUTPATIENT)
Age: 75
End: 2025-02-26

## (undated) DEVICE — SOL BETADINE POUCH 0.75OZ STERILE

## (undated) DEVICE — WOUND IRR IRRISEPT W 0.5 CHG

## (undated) DEVICE — DRSG KERLIX MED 6"

## (undated) DEVICE — WAVEGUIDE ILLUMINATOR EIGR SABER W YANKAUER TAPERED TIP

## (undated) DEVICE — DRSG TELFA 3 X 4

## (undated) DEVICE — ELCTR AQUAMANTYS BIPOLAR SEALER 6.0

## (undated) DEVICE — FRAZIER SUCTION TIP 10FR

## (undated) DEVICE — NDL HYPO SAFE 22G X 1.5" (BLACK)

## (undated) DEVICE — DRAPE SURGICAL #1010

## (undated) DEVICE — DRSG DERMABOND 0.7ML

## (undated) DEVICE — ELCTR BOVIE TIP BLADE INSULATED 4" EDGE

## (undated) DEVICE — GLV COTTON LG STERILE

## (undated) DEVICE — DRAPE TOWEL BLUE STICKY

## (undated) DEVICE — DRSG TEGADERM 6"X8"

## (undated) DEVICE — NDL HYPO SAFE 20G X 1.5" (YELLOW)

## (undated) DEVICE — GLV 8 DURAPRENE

## (undated) DEVICE — SYR LUER LOK 30CC

## (undated) DEVICE — SAW BLADE STRYKER SAGITTAL DUAL CUT 75X18X1.27MM

## (undated) DEVICE — GLV 8 PROTEXIS (WHITE)

## (undated) DEVICE — SYR ASEPTO

## (undated) DEVICE — PACK TOTAL HIP

## (undated) DEVICE — DRAPE C ARM UNIVERSAL

## (undated) DEVICE — SUT SILK 2-0 18" TIES

## (undated) DEVICE — WARMING BLANKET UPPER ADULT

## (undated) DEVICE — DRAPE 3/4 SHEET 52X76"

## (undated) DEVICE — HOOD FLYTE STRYKER SURGICOOL W PEELAWAY

## (undated) DEVICE — VISITEC 4X4

## (undated) DEVICE — VENODYNE/SCD SLEEVE CALF MEDIUM

## (undated) DEVICE — DRAPE XL SHEET 77X98"

## (undated) DEVICE — SUT VICRYL 3-0 18" SH UNDYED (POP-OFF)

## (undated) DEVICE — POSITIONER FOAM EGG CRATE ULNAR 2PCS (PINK)

## (undated) DEVICE — SUT NDL MAYO CATGUT 1/2 CIRCLE TROCAR POINT 0.050" X 1.521"

## (undated) DEVICE — DRSG COBAN 4"

## (undated) DEVICE — MAKO VIZADISC HIP PROCEDURE TRACKING KIT

## (undated) DEVICE — SLING SHOULDER IMMOBILIZER CLINIC LARGE

## (undated) DEVICE — SUT VICRYL 2-0 27" CT-2 UNDYED

## (undated) DEVICE — DRAPE 1/2 SHEET 40X57"

## (undated) DEVICE — DRAPE IOBAN 23" X 17"

## (undated) DEVICE — MAKO DRAPE KIT

## (undated) DEVICE — SUT VICRYL 3-0 18" PS-2 UNDYED

## (undated) DEVICE — SUT STRATAFIX SPIRAL PDO 1 36CM CTX

## (undated) DEVICE — DRAPE IOBAN 33" X 23"

## (undated) DEVICE — SUT MONOCRYL 3-0 27" PS-2 UNDYED

## (undated) DEVICE — DRAPE U LONG 47X70"

## (undated) DEVICE — SUT ETHIBOND 2 30" V37